# Patient Record
Sex: FEMALE
[De-identification: names, ages, dates, MRNs, and addresses within clinical notes are randomized per-mention and may not be internally consistent; named-entity substitution may affect disease eponyms.]

---

## 2018-03-27 ENCOUNTER — HOSPITAL ENCOUNTER (EMERGENCY)
Dept: HOSPITAL 42 - ED | Age: 48
Discharge: HOME | End: 2018-03-27
Payer: COMMERCIAL

## 2018-03-27 VITALS
DIASTOLIC BLOOD PRESSURE: 85 MMHG | RESPIRATION RATE: 18 BRPM | SYSTOLIC BLOOD PRESSURE: 136 MMHG | HEART RATE: 68 BPM | TEMPERATURE: 97.9 F | OXYGEN SATURATION: 98 %

## 2018-03-27 VITALS — BODY MASS INDEX: 36.6 KG/M2

## 2018-03-27 DIAGNOSIS — R42: Primary | ICD-10-CM

## 2018-03-27 LAB
ALBUMIN SERPL-MCNC: 4.3 G/DL (ref 3–4.8)
ALBUMIN/GLOB SERPL: 1.3 {RATIO} (ref 1.1–1.8)
ALT SERPL-CCNC: 61 U/L (ref 7–56)
APPEARANCE UR: CLEAR
APTT BLD: 33.3 SECONDS (ref 25.1–36.5)
AST SERPL-CCNC: 36 U/L (ref 14–36)
BACTERIA #/AREA URNS HPF: (no result) /[HPF]
BASOPHILS # BLD AUTO: 0.03 K/MM3 (ref 0–2)
BASOPHILS NFR BLD: 0.4 % (ref 0–3)
BILIRUB UR-MCNC: NEGATIVE MG/DL
BUN SERPL-MCNC: 10 MG/DL (ref 7–21)
CALCIUM SERPL-MCNC: 9.6 MG/DL (ref 8.4–10.5)
COLOR UR: YELLOW
EOSINOPHIL # BLD: 0.2 10*3/UL (ref 0–0.7)
EOSINOPHIL NFR BLD: 3 % (ref 1.5–5)
EPI CELLS #/AREA URNS HPF: (no result) /HPF (ref 0–5)
ERYTHROCYTE [DISTWIDTH] IN BLOOD BY AUTOMATED COUNT: 13.7 % (ref 11.5–14.5)
GFR NON-AFRICAN AMERICAN: > 60
GLUCOSE UR STRIP-MCNC: NEGATIVE MG/DL
GRANULOCYTES # BLD: 4.83 10*3/UL (ref 1.4–6.5)
GRANULOCYTES NFR BLD: 68.9 % (ref 50–68)
HGB BLD-MCNC: 14.3 G/DL (ref 12–16)
INR PPP: 1.03 (ref 0.93–1.08)
LEUKOCYTE ESTERASE UR-ACNC: NEGATIVE LEU/UL
LYMPHOCYTES # BLD: 1.5 10*3/UL (ref 1.2–3.4)
LYMPHOCYTES NFR BLD AUTO: 21.9 % (ref 22–35)
MCH RBC QN AUTO: 28.7 PG (ref 25–35)
MCHC RBC AUTO-ENTMCNC: 33.6 G/DL (ref 31–37)
MCV RBC AUTO: 85.2 FL (ref 80–105)
MONOCYTES # BLD AUTO: 0.4 10*3/UL (ref 0.1–0.6)
MONOCYTES NFR BLD: 5.8 % (ref 1–6)
PH UR STRIP: 7 [PH] (ref 4.7–8)
PLATELET # BLD: 306 10^3/UL (ref 120–450)
PMV BLD AUTO: 9.3 FL (ref 7–11)
PROT UR STRIP-MCNC: NEGATIVE MG/DL
PROTHROMBIN TIME: 11.7 SECONDS (ref 9.4–12.5)
RBC # BLD AUTO: 4.99 10^6/UL (ref 3.5–6.1)
RBC # UR STRIP: (no result) /UL
RBC #/AREA URNS HPF: (no result) /HPF (ref 0–2)
SP GR UR STRIP: 1.01 (ref 1–1.03)
TROPONIN I SERPL-MCNC: < 0.01 NG/ML
UROBILINOGEN UR STRIP-ACNC: 0.2 E.U./DL
WBC # BLD AUTO: 7 10^3/UL (ref 4.5–11)
WBC #/AREA URNS HPF: NEGATIVE /HPF (ref 0–6)

## 2018-03-27 NOTE — ED PDOC
Arrival/HPI





- General


Chief Complaint: Dizziness/Lightheaded


Time Seen by Provider: 03/27/18 14:34


Historian: Patient





- History of Present Illness


Narrative History of Present Illness (Text): 


you were treated in the ED today for having dizziness/room spinning for about 1 

week but otherwise without any nausea/vomiting/headache/current dizziness/

difficulty breathing/chest pain/abdomen pain/numbness/tingling/loss of limb 

function/pain with urination. 


03/27/18 15:00





Time/Duration: 1 week


Symptom Onset: Gradual


Symptom Course: Unchanged, Resolved, Intermittent


Quality: Other (no pain)


Activities at Onset: Rest


Context: Sitting





Past Medical History





- Provider Review


Nursing Documentation Reviewed: Yes





- Travel History


Have you recently traveled outside [place] w/in the past 3 mons?: No





- Infectious Disease


Hx of Infectious Diseases: None





- Reproductive


Menopause: No





- Cardiac


Hx Cardiac Disorders: No





- Pulmonary


Hx Asthma: Yes





- Neurological


Hx Neurological Disorder: No





- HEENT


Hx HEENT Disorder: No





- Renal


Hx Renal Disorder: No





- Endocrine/Metabolic


Hx Endocrine Disorders: No





- Hematological/Oncological


Hx Blood Disorders: No





- Integumentary


Hx Dermatological Disorder: No





- Musculoskeletal/Rheumatological


Hx Musculoskeletal Disorders: No





- Gastrointestinal


Hx Gastrointestinal Disorders: No





- Genitourinary/Gynecological


Hx Genitourinary Disorders: No





- Psychiatric


Hx Psychophysiologic Disorder: Yes


Hx Anxiety: Yes


Hx Depression: Yes


Hx Substance Use: No





- Anesthesia


Hx Anesthesia: Yes


Hx Anesthesia Reactions: No





Family/Social History





- Physician Review


Nursing Documentation Reviewed: Yes


Family/Social History: No Known Family HX


Smoking Status: Never Smoked


Hx Alcohol Use: No


Hx Substance Use: No





Allergies/Home Meds


Allergies/Adverse Reactions: 


Allergies





No Known Allergies Allergy (Verified 03/27/18 14:49)


 








Home Medications: 


 Home Meds











 Medication  Instructions  Recorded  Confirmed


 


ALPRAZolam [Xanax] 1 mg PO PRN PRN 01/21/16 03/27/18


 


Citalopram Hydrobromide [Celexa] 40 mg PO DAILY 01/21/16 03/27/18


 


traZODone [Desyrel] 50 mg PO HS 01/21/16 03/27/18


 


Beclomethasone Dipropionate [Qvar 0 gm IH PRN PRN 03/27/18 03/27/18





Redihaler]   


 


Tiotropium Br/Olodaterol HCl 0 mg IH PRN PRN 03/27/18 03/27/18





[Stiolto Respimat Inhal Spray]   














Review of Systems





- Review of Systems


Constitutional: Normal


Eyes: Normal


Cardiovascular: Normal


Gastrointestinal: Normal


Genitourinary Female: Normal


Musculoskeletal: Normal


Skin: Normal


Neurological: Dizziness


Endocrine: Normal


Hemo/Lymphatic: Normal


Psychiatric: Normal





Physical Exam


Vital Signs Reviewed: Yes


Vital Signs











  Temp Pulse Resp BP Pulse Ox


 


 03/27/18 14:23  97.9 F  68  18  136/85  98











Temperature: Afebrile


Blood Pressure: Hypertensive


Pulse: Regular


Respiratory Rate: Normal


Appearance: Positive for: Well-Appearing, Non-Toxic, Comfortable


Pain Distress: None


Mental Status: Positive for: Alert and Oriented X 3


Finger Stick Blood Glucose: 107





- Systems Exam


Head: Present: Atraumatic, Normocephalic


Pupils: Present: PERRL


Extroacular Muscles: Present: EOMI


Conjunctiva: Present: Normal


Ears: Present: Normal


Mouth: Present: Moist Mucous Membranes


Pharnyx: Present: Normal


Nose (External): Present: Atraumatic


Nose (Internal): Present: Normal Inspection


Neck: Present: Normal Range of Motion


Respiratory/Chest: Present: Clear to Auscultation, Good Air Exchange


Cardiovascular: Present: Regular Rate and Rhythm


Abdomen: No: Tenderness, Distention, Normal Bowel Sounds, Peritoneal Signs, 

Rebound, Guarding, McBurney's Point Tender, Rovsing's Sign Present, Hernias, 

Feeding Tubes, Ostomy Tubes, Mass/Organomegaly, Scars, Other


Back: Present: Normal Inspection


Upper Extremity: Present: Normal Inspection


Lower Extremity: Present: Normal Inspection


Neurological: Present: GCS=15, CN II-XII Intact, Speech Normal, Motor Func 

Grossly Intact


Skin: Present: Warm, Normal Color


Psychiatric: Present: Alert, Oriented x 3, Normal Insight, Normal Concentration





Medical Decision Making


ED Course and Treatment: 





03/27/18 15:02


you were treated in the ED today for having dizziness/room spinning for about 1 

week but otherwise without any nausea/vomiting/headache/current dizziness/

difficulty breathing/chest pain/abdomen pain/numbness/tingling/loss of limb 

function/pain with urination. You were otherwise breathing easily, smiling and 

talking with your friend, good strength/sensation, walking easily, clear lungs, 

no abdomen tenderness, no fever temp 97.9, stable heart rate 68, stable 

breathing rate 136/85, excellent oxygen level 98% room air, elevated blood 

pressure 136/85 which we recommend repeat in 2-3 days primary care office to 

determine further treatment, you have blood tests no infection count 7, stable 

blood level hemoglobin 14/platelets 306, stable chemistry, heart blood test 

negative less than 0.01, urine test negative for sign of infection, urine 

pregnancy test negative, radiology ct head no acute findings, ECG normal sinus 

rhythm, meclizine, observation done in the ED with improvement, had a long 

conversation regarding staying in the hospital for further observation/testing 

but you refused and cautioned for complications/death but you stated you feel 

improved and wanted to followup with your primary care, counselled to monitor 

symptoms and thus discharged home with friend. 1. Recommend meclizine as 

directed for dizziness 2. Recommend follow-up primary care 2-3 days to review 

symptoms, referral to neurology, ear nose throat and cardiology to review your 

symptoms, referral to gastroenterology clinic for lab liver test mild elevation 

ALT 61 to ensure no complications. 4. If any worsening pain, fever, chills, 

nausea, vomiting, difficulty breathing, numbness, loss of limb function, pain 

with urination or any medical condition then return to the ED.








03/27/18 16:52





Reassessment Condition: Re-examined, Improved





- Lab Interpretations


Lab Results: 








 03/27/18 15:19 





 03/27/18 15:19 





 Lab Results





03/27/18 15:19: Sodium 140, Potassium 4.4, Chloride 105, Carbon Dioxide 28, 

Anion Gap 12, BUN 10, Creatinine 0.8, Est GFR (African Amer) > 60, Est GFR (Non-

Af Amer) > 60, Random Glucose 112 H, Calcium 9.6, Magnesium 2.2, Total 

Bilirubin 0.2, AST 36, ALT 61 H, Alkaline Phosphatase 73, Lactate Dehydrogenase 

520, Total Creatine Kinase 61, Troponin I < 0.01, Total Protein 7.6, Albumin 4.3

, Globulin 3.3, Albumin/Globulin Ratio 1.3


03/27/18 15:19: Urine Color Yellow, Urine Appearance Clear, Urine pH 7.0, Ur 

Specific Gravity 1.010, Urine Protein Negative, Urine Glucose (UA) Negative, 

Urine Ketones Negative, Urine Blood Trace-intact H, Urine Nitrate Negative, 

Urine Bilirubin Negative, Urine Urobilinogen 0.2, Ur Leukocyte Esterase Negative

, Urine RBC 0 - 2, Urine WBC Negative, Ur Epithelial Cells 0 - 2, Urine 

Bacteria Trace


03/27/18 15:19: PT 11.7, INR 1.03, APTT 33.3


03/27/18 15:19: WBC 7.0  D, RBC 4.99, Hgb 14.3, Hct 42.5, MCV 85.2, MCH 28.7, 

MCHC 33.6, RDW 13.7, Plt Count 306, MPV 9.3, Gran % 68.9 H, Lymph % (Auto) 21.9 

L, Mono % (Auto) 5.8, Eos % (Auto) 3.0, Baso % (Auto) 0.4, Gran # 4.83, Lymph # 

(Auto) 1.5, Mono # (Auto) 0.4, Eos # (Auto) 0.2, Baso # (Auto) 0.03








I have reviewed the lab results: Yes





- RAD Interpretation


Radiology Orders: 








03/27/18 14:57


HEAD W/O CONTRAST [CT] Stat 











: Radiologist (ct head no acute findings)





- Medication Orders


Current Medication Orders: 











Discontinued Medications





Meclizine HCl (Antivert)  50 mg PO STAT STA


   Stop: 03/27/18 14:59


   Last Admin: 03/27/18 15:35  Dose: 50 mg











NIHSS Stroke Scale 3





- Date/Time Evaluation Performed


Date Performed: 03/27/18


When Was NIHSS Performed: Baseline





- How Severe is the Stroke


Level of Consciousness: 0=Alert


LOC to Questions: 0=Both comments correct


LOC to commands: 0=Obeys both correctly


Best Gaze: 0=Normal


Visual: 0=No visual loss


Facial: 0=Normal


Motor Arm - Left: 0=No drift


Motor Arm - Right: 0=No drift


Motor Leg - Left: 0=No drift


Motor Leg - Right: 0=No drift


Limb Ataxia: 0=Absent


Sensory: 0=Normal


Best Language: 0=No aphasia


Dysarthia: 0=Normal articulation


Extinction & Inattention (Neglect): 0=Normal, no object


Score: 0





Disposition/Present on Arrival





- Present on Arrival


Any Indicators Present on Arrival: No


History of DVT/PE: No


History of Uncontrolled Diabetes: No


Urinary Catheter: No


History of Decub. Ulcer: No


History Surgical Site Infection Following: None





- Disposition


Have Diagnosis and Disposition been Completed?: Yes


Diagnosis: 


 Dizziness, Vertigo





Disposition: HOME/ ROUTINE


Disposition Time: 16:54


Patient Plan: Discharge


Condition: IMPROVED


Discharge Instructions (ExitCare):  Vertigo (a Type of Dizziness) (DC)


Additional Instructions: 


you were treated in the ED today for having dizziness/room spinning for about 1 

week but otherwise without any nausea/vomiting/headache/current dizziness/

difficulty breathing/chest pain/abdomen pain/numbness/tingling/loss of limb 

function/pain with urination. You were otherwise breathing easily, smiling and 

talking with your friend, good strength/sensation, walking easily, clear lungs, 

no abdomen tenderness, no fever temp 97.9, stable heart rate 68, stable 

breathing rate 136/85, excellent oxygen level 98% room air, elevated blood 

pressure 136/85 which we recommend repeat in 2-3 days primary care office to 

determine further treatment, you have blood tests no infection count 7, stable 

blood level hemoglobin 14/platelets 306, stable chemistry, heart blood test 

negative less than 0.01, urine test negative for sign of infection, urine 

pregnancy test negative, radiology ct head no acute findings, ECG normal sinus 

rhythm, meclizine, observation done in the ED with improvement, had a long 

conversation regarding staying in the hospital for further observation/testing 

but you refused and cautioned for complications/death but you stated you feel 

improved and wanted to followup with your primary care, counselled to monitor 

symptoms and thus discharged home with friend. 1. Recommend meclizine as 

directed for dizziness and don't work/drive/drink alcohol when using. 2. 

Recommend follow-up primary care 2-3 days to review symptoms, referral to 

neurology, ear nose throat and cardiology to review your symptoms, referral to 

gastroenterology clinic for lab liver test mild elevation ALT 61 to ensure no 

complications. 4. If any worsening pain, fever, chills, nausea, vomiting, 

difficulty breathing, numbness, loss of limb function, pain with urination or 

any medical condition then return to the ED.


Prescriptions: 


Meclizine [Antivert] 12.5 mg PO DAILY PRN 12 Days #12 tab


 PRN Reason: Dizziness


Referrals: 


Juany Correia MD [Primary Care Provider] - Follow up with primary


Forms:  [employer] (English)

## 2018-03-27 NOTE — CT
PROCEDURE:  CT HEAD WITHOUT CONTRAST.



HISTORY:

47yoF, dizziness



COMPARISON:

None available. 



TECHNIQUE:

Axial computed tomography images were obtained through the head/brain 

without intravenous contrast.  



Radiation dose:



Total exam DLP = 794 mGy-cm.



This CT exam was performed using one or more of the following dose 

reduction techniques: Automated exposure control, adjustment of the 

mA and/or kV according to patient size, and/or use of iterative 

reconstruction technique.



FINDINGS:



HEMORRHAGE:

No intracranial hemorrhage. 



BRAIN:

No mass effect or edema.  No atrophy or chronic microvascular 

ischemic changes.



VENTRICLES:

Unremarkable. No hydrocephalus. 



CALVARIUM:

Unremarkable.



PARANASAL SINUSES:

Unremarkable as visualized. No significant inflammatory changes.



MASTOID AIR CELLS:

Unremarkable as visualized. No inflammatory changes.



OTHER FINDINGS:

None.



IMPRESSION:

No acute findings

## 2018-03-27 NOTE — CARD
--------------- APPROVED REPORT --------------





EKG Measurement

Heart [id number]

NC [id number]

[id number]

[id number]

[id number]



<Conclusion>

Normal sinus rhythm

Cannot rule out Anterior infarct, age undetermined

Abnormal ECG